# Patient Record
(demographics unavailable — no encounter records)

---

## 2025-05-21 NOTE — ASSESSMENT
[FreeTextEntry1] : ANAMARIA, 6 year old boy with history of recurring respiratory symptoms, including chronic cough, wheezing and SOB with activity, which along with physical examination (prolonged exhalation) support persistent asthma. Recommend implementing an asthma controller inhaler to control symptom frequency and lessen the likelihood of severe respiratory complications, including life-threatening events. Discussed asthma etiology, risk factors, triggers (e.g. URIs, allergens, etc), complications and management. Educated on proper MDI/spacer technique, importance of medication compliance and encouraged tobacco smoke exposure avoidance. Discussed signs of respiratory distress requiring medical attention. Low threshold to use oral steroids for persistent or significant asthma symptoms.  Data reviewed and interpreted by me, including pulmonary function test (PFT)/spirometry, revealed potential mild obstruction (although technique was suboptimal) which supports uncontrolled asthma.   Allergic Rhinitis (AR). AR is likely present per exam (allergic shiners) and ongoing nasal congestion. Will send allergy blood testing.  Snoring. Nasal congestion with enlarged adenoids may contribute to nasal symptoms and in addition may be associated with Obstructive Sleep Apnea (CYN). Referred to ENT for evaluation. Vomiting. Recurrent vomiting of unclear etiology. Given chronicity, and potential underlying atopy, referral to GI was recommended for evaluation. Conditions such as EoE may need to be considered.   Discussed above assessment, management plan and potential medication side effects. Parent agreed with plan. All queries were answered. Evaluation includes normal saturation. Time excludes separately reported services.   Recommend: - DAILY CONTROLLER INHALER: Start Fluticasone Propionate 110 mcg, 2 puffs twice daily (continue even if doing well, use with a spacer, rinse mouth/brush teeth after use). - RESCUE AS NEEDED INHALER: Albuterol, 2 - 4 puffs (or 1 nebulized vial) every 4 - 6 hours, "as needed" for cough, shortness of breath or wheeze (rescue inhaler). - Proper technique of inhaled medication administration reviewed. Adequate technique confirmed. - Pediatric ENT referral. - GI referral. Please schedule an appointment at [phone]. - Diagnostic orders/procedures: allergy blood testing. May do any blood work at any [facility] location. Closest: [address], [place] [address] located downstairs. - Follow-up in 3-4 months. - Repeat simple PFT (blowing test) at next visit.

## 2025-05-21 NOTE — END OF VISIT
[FreeTextEntry3] : I, Dr. Torito Mary, personally performed the evaluation and management (E/M) services for this new patient. That E/M includes conducting the clinically appropriate initial history &/or exam, assessing all conditions, and establishing the plan of care. Today, my DAXA, Farida Reich, PNP], was here to observe my evaluation and management service for this patient & follow plan of care established by me going forward. [Time Spent: ___ minutes] : I have spent [unfilled] minutes of time on the encounter which excludes teaching and separately reported services.

## 2025-05-21 NOTE — CONSULT LETTER
[Dear  ___] : Dear  [unfilled], [Consult Letter:] : I had the pleasure of evaluating your patient, [unfilled]. [Please see my note below.] : Please see my note below. [Consult Closing:] : Thank you very much for allowing me to participate in the care of this patient.  If you have any questions, please do not hesitate to contact me. [Sincerely,] : Sincerely, [FreeTextEntry3] : Torito Culver MD Attending Physician, Division of Pediatric Pulmonology.

## 2025-05-21 NOTE — ASSESSMENT
[FreeTextEntry1] : ANAMARIA, 6 year old boy with history of recurring respiratory symptoms, including chronic cough, wheezing and SOB with activity, which along with physical examination (prolonged exhalation) support persistent asthma. Recommend implementing an asthma controller inhaler to control symptom frequency and lessen the likelihood of severe respiratory complications, including life-threatening events. Discussed asthma etiology, risk factors, triggers (e.g. URIs, allergens, etc), complications and management. Educated on proper MDI/spacer technique, importance of medication compliance and encouraged tobacco smoke exposure avoidance. Discussed signs of respiratory distress requiring medical attention. Low threshold to use oral steroids for persistent or significant asthma symptoms.  Data reviewed and interpreted by me, including pulmonary function test (PFT)/spirometry, revealed potential mild obstruction (although technique was suboptimal) which supports uncontrolled asthma.   Allergic Rhinitis (AR). AR is likely present per exam (allergic shiners) and ongoing nasal congestion. Will send allergy blood testing.  Snoring. Nasal congestion with enlarged adenoids may contribute to nasal symptoms and in addition may be associated with Obstructive Sleep Apnea (CYN). Referred to ENT for evaluation. Vomiting. Recurrent vomiting of unclear etiology. Given chronicity, and potential underlying atopy, referral to GI was recommended for evaluation. Conditions such as EoE may need to be considered.   Discussed above assessment, management plan and potential medication side effects. Parent agreed with plan. All queries were answered. Evaluation includes normal saturation. Time excludes separately reported services.   Recommend: - DAILY CONTROLLER INHALER: Start Fluticasone Propionate 110 mcg, 2 puffs twice daily (continue even if doing well, use with a spacer, rinse mouth/brush teeth after use). - RESCUE AS NEEDED INHALER: Albuterol, 2 - 4 puffs (or 1 nebulized vial) every 4 - 6 hours, "as needed" for cough, shortness of breath or wheeze (rescue inhaler). - Proper technique of inhaled medication administration reviewed. Adequate technique confirmed. - Pediatric ENT referral. - GI referral. Please schedule an appointment at [phone]. - Diagnostic orders/procedures: allergy blood testing. May do any blood work at any [facility] location. Closest: [address], [place] [address] located downstairs. - Follow-up in 3-4 months. - Repeat simple PFT (blowing test) at next visit.

## 2025-05-21 NOTE — IMPRESSION
[FreeTextEntry1] : 5/21/2025 simple pft/spirometry: stepdown of flows with questionable scooping suggest mild obstruction (suboptimal technique).

## 2025-05-21 NOTE — REVIEW OF SYSTEMS
[NI] : Genitourinary  [Nl] : Endocrine [Nasal Congestion] : nasal congestion [Cough] : cough [Shortness of Breath] : shortness of breath

## 2025-05-21 NOTE — DATA REVIEWED
[FreeTextEntry1] : I personally reviewed records, documentation and images (findings included into my note), by and including: - No images to review.

## 2025-05-21 NOTE — REASON FOR VISIT
[Initial Evaluation] : an initial evaluation of [Cough] : cough [Parents] : parents [Other: _____] : [unfilled] [FreeTextEntry2] : recurrent respiratory infections

## 2025-05-21 NOTE — PHYSICAL EXAM
[Well Nourished] : well nourished [Well Developed] : well developed [Alert] : ~L alert [Active] : active [Normal Breathing Pattern] : normal breathing pattern [No Respiratory Distress] : no respiratory distress [No Allergic Shiners] : no allergic shiners [No Drainage] : no drainage [No Conjunctivitis] : no conjunctivitis [Nasal Mucosa Non-Edematous] : nasal mucosa non-edematous [No Nasal Drainage] : no nasal drainage [No Polyps] : no polyps [No Sinus Tenderness] : no sinus tenderness [No Oral Pallor] : no oral pallor [No Oral Cyanosis] : no oral cyanosis [Non-Erythematous] : non-erythematous [No Exudates] : no exudates [No Postnasal Drip] : no postnasal drip [No Tonsillar Enlargement] : no tonsillar enlargement [Absence Of Retractions] : absence of retractions [Symmetric] : symmetric [Good Expansion] : good expansion [No Acc Muscle Use] : no accessory muscle use [Good aeration to bases] : good aeration to bases [Equal Breath Sounds] : equal breath sounds bilaterally [No Crackles] : no crackles [No Rhonchi] : no rhonchi [Normal Sinus Rhythm] : normal sinus rhythm [No Heart Murmur] : no heart murmur [Soft, Non-Tender] : soft, non-tender [No Hepatosplenomegaly] : no hepatosplenomegaly [Non Distended] : was not ~L distended [Abdomen Mass (___ Cm)] : no abdominal mass palpated [Full ROM] : full range of motion [No Clubbing] : no clubbing [Capillary Refill < 2 secs] : capillary refill less than two seconds [No Cyanosis] : no cyanosis [No Petechiae] : no petechiae [No Kyphoscoliosis] : no kyphoscoliosis [No Contractures] : no contractures [Alert and  Oriented] : alert and oriented [No Abnormal Focal Findings] : no abnormal focal findings [Normal Muscle Tone And Reflexes] : normal muscle tone and reflexes [No Birth Marks] : no birth marks [No Rashes] : no rashes [No Skin Lesions] : no skin lesions [FreeTextEntry7] : +prolonged exhalation phase, +questionable wheezing

## 2025-05-21 NOTE — HISTORY OF PRESENT ILLNESS
[FreeTextEntry1] : 6yr old male child presents to evaluation of recurrent cough, nasal congestion and snoring. Lived in [place], moved to [place] 1.5 years old.  INITIAL HISTORY 5/21/2025 PULM: trouble breathing and chest pain for past 1.5mos. C/o chronic fatigue versus SOB, cannot play for more than 1-2 mins. Also, reports chest pain. ENT: chronic nasal congested, worse at night with snoring and witnessed apneas. No mouth breathing. GI: recurrent vomiting, often after eating. EMS called once. Recently, has daily vomiting for the past week. ID: history of PNA in Oct 2024 and mild COVID-19 in Jan 2025.  PMH: denies  PSH: denies Meds: vitamin D Birth Hx: 36 weeks, C/S for fetal decals- NICU x 2 days for 'breathing problem' required oxygen for a few hours PCP/Specialists: Dr. Elvin Ray  ENT for recurrent AOM, no ear tubes Family hx:  Mo- Healthy Fa- Heart failure Sister, 4yo- Healthy Family hx of asthma: denies. Family hx of cystic fibrosis, autoimmune disease, recurrent respiratory infections: denies Feeding issues (i,e: NARA, dysphagia): denies  Hx of Eczema: denies Hx of rhinitis, post nasal drip:  denies  Hx of recurrent infections (ie: pneumonia, AOM, sinusitis): x1 PNA in October 2024. Seen by pulmonologist before: denies Vaccines UTD: Yes Flu vax: Yes COVID 19 vax : Yes   Cough Hx: Triggers: Allergies: Hx of wheezing: denies  Hx of albuterol use: denies  Hx of ICS use:  denies  Use of oral steroids: denies ED/Hospitalizations: denies Snoring: denies but very congested  Daily daytime cough: denies Daily nighttime cough: denies Respiratory symptoms with exercise: yes Chest x-ray: yes in [place] for illness? mom unable to remember reason for CXR but reportedly normal  Seen by pulmonologist prior to this visit:  denies   Modified Asthma Predictive Index (mAPI): 4 wheezing illnesses AND 1 major criteria: Parental asthma   NO  atopic dermatitis   NO aeroallergen sensitization  NO  OR  2 minor criteria: Food sensitization   NO  peripheral blood eosinophilia =4%  NO  wheezing apart from colds   NO   Asthma Control Test (ACT): 1. Asthma today?                  3-very good, 2-good, 1-bad, 0-very bad.                         Score: 1 2. Symptoms with activity?   3-very good, 2-sometimes, 1-frequently, 0-all the time.    Score: 1 3. How is cough?                  3-none, 2-sometimes, 1 -most time, 0-all the time.             Score: 2 4. Nighttime awakening?       3-none, 2-sometimes, 1-most time, 0-all the time.              Score: 3 5. Symptoms presented        5) none, 4) 1 - 3 times, 3) 4 - 10 times, 2) 11 - 18 time, 1) 19 - 24 times, 0) everyday. ---Daytime stx?   Score: 0 ---Wheezing?      Score: 4 ---Wake up?        Score: 5  Total score: 16